# Patient Record
Sex: FEMALE | Race: WHITE | NOT HISPANIC OR LATINO
[De-identification: names, ages, dates, MRNs, and addresses within clinical notes are randomized per-mention and may not be internally consistent; named-entity substitution may affect disease eponyms.]

---

## 2021-05-26 ENCOUNTER — NON-APPOINTMENT (OUTPATIENT)
Age: 71
End: 2021-05-26

## 2021-05-26 ENCOUNTER — LABORATORY RESULT (OUTPATIENT)
Age: 71
End: 2021-05-26

## 2021-05-26 ENCOUNTER — APPOINTMENT (OUTPATIENT)
Dept: FAMILY MEDICINE | Facility: CLINIC | Age: 71
End: 2021-05-26
Payer: MEDICARE

## 2021-05-26 VITALS
TEMPERATURE: 97.2 F | HEIGHT: 61 IN | BODY MASS INDEX: 19.83 KG/M2 | SYSTOLIC BLOOD PRESSURE: 114 MMHG | DIASTOLIC BLOOD PRESSURE: 70 MMHG | HEART RATE: 106 BPM | OXYGEN SATURATION: 99 % | WEIGHT: 105 LBS

## 2021-05-26 DIAGNOSIS — R79.89 OTHER SPECIFIED ABNORMAL FINDINGS OF BLOOD CHEMISTRY: ICD-10-CM

## 2021-05-26 DIAGNOSIS — Z82.3 FAMILY HISTORY OF STROKE: ICD-10-CM

## 2021-05-26 PROCEDURE — G0444 DEPRESSION SCREEN ANNUAL: CPT | Mod: 59

## 2021-05-26 PROCEDURE — 99204 OFFICE O/P NEW MOD 45 MIN: CPT | Mod: 25

## 2021-05-26 PROCEDURE — 93000 ELECTROCARDIOGRAM COMPLETE: CPT | Mod: 59

## 2021-05-26 PROCEDURE — 36415 COLL VENOUS BLD VENIPUNCTURE: CPT

## 2021-05-26 NOTE — HEALTH RISK ASSESSMENT
[With Significant Other] : lives with significant other [# of Members in Household ___] :  household currently consist of [unfilled] member(s) [] :  [# Of Children ___] : has [unfilled] children [ColonoscopyComments] : never had  [de-identified] : daugther and granddaughter [FreeTextEntry3] : son and daughter

## 2021-05-26 NOTE — PHYSICAL EXAM
[Normal Sclera/Conjunctiva] : normal sclera/conjunctiva [Normal Oropharynx] : the oropharynx was normal [No Lymphadenopathy] : no lymphadenopathy [Supple] : supple [No Edema] : there was no peripheral edema [No Extremity Clubbing/Cyanosis] : no extremity clubbing/cyanosis [Normal] : affect was normal and insight and judgment were intact [de-identified] : esotropia [de-identified] : Tenderness to palpation upper thoracic paraspinal region [de-identified] : bilateral hands arthritic changes;  strength intact; patellar grind r knee > left.

## 2021-05-26 NOTE — HISTORY OF PRESENT ILLNESS
[FreeTextEntry1] : Ms. SHAW presents to establish care, joint pain and palpitations. \par  [de-identified] : Ms. SHAW presents to establish care, joint pain and palpitations. \par \par Follows with Dr. Reyes for Heme/Onc. \par Breast Cancer diagnosed 7 years ago. \par Has port. \par \par Had bloodwork done at beginning of May. \par \par -never had colonoscopy; never done colon cancer screening. \par \par Has been dealing with Arthritis in hands and wrists. \par Has been taking ibuprofen for knee pain. 2 tablets every 6 hours; started 2 weeks ago. Feels like she can't move in the AM. \par Takes Senna as needed for constipation. \par \par COVID shot March 28th completed. \par \par Has been experiencing palpitations-thinks it could be related to stress;  is sick. \par Medications and allergies reviewed.\par

## 2021-05-26 NOTE — REVIEW OF SYSTEMS
[Palpitations] : palpitations [Constipation] : constipation [Joint Pain] : joint pain [Negative] : Respiratory [Chest Pain] : no chest pain [Abdominal Pain] : no abdominal pain [Nausea] : no nausea [Vomiting] : no vomiting [Dysuria] : no dysuria [Headache] : no headache [Dizziness] : no dizziness [Suicidal] : not suicidal [de-identified] : Anxious;  diagnosed with small cell lung cnacer

## 2021-05-26 NOTE — PLAN
[FreeTextEntry1] : Will follow up labwork drawn in office today.\par \par Joint Pain-evaluate rheum markers.  May require rheumatology eval for bilateral joint pain. \par \par Palpitations-EKG non-acute; change from prior on file 8 years ago. Possible anxiety component. \par Recommending Cardio eval given change and cancer hx. \par \par Stress/Anxiety-discussed possibility of pursuing therapy; patient wants to hold off at this point; also discussed caregiver support groups.  PHQ-9 performed.  No suicidal thoughts or ideations. \par \par Will adjust meds based on labs. \par Discussed with MsKellen COLIN precautions and advised to go to seek immediate medical re-evaluation if condition worsened.  Ms. CHARLEE COLIN expressed understanding of the plan.\par \par

## 2021-05-27 DIAGNOSIS — M25.50 PAIN IN UNSPECIFIED JOINT: ICD-10-CM

## 2021-05-27 PROBLEM — R79.89 ABNORMAL CBC: Status: ACTIVE | Noted: 2021-05-27

## 2021-05-27 LAB
ALBUMIN SERPL ELPH-MCNC: 4.2 G/DL
ALP BLD-CCNC: 68 U/L
ALT SERPL-CCNC: 13 U/L
ANION GAP SERPL CALC-SCNC: 13 MMOL/L
AST SERPL-CCNC: 14 U/L
BASOPHILS # BLD AUTO: 0.08 K/UL
BASOPHILS NFR BLD AUTO: 0.7 %
BILIRUB SERPL-MCNC: 0.2 MG/DL
BUN SERPL-MCNC: 15 MG/DL
CALCIUM SERPL-MCNC: 9.7 MG/DL
CHLORIDE SERPL-SCNC: 102 MMOL/L
CHOLEST SERPL-MCNC: 242 MG/DL
CO2 SERPL-SCNC: 24 MMOL/L
CREAT SERPL-MCNC: 0.65 MG/DL
CRP SERPL-MCNC: 51 MG/L
EOSINOPHIL # BLD AUTO: 0.19 K/UL
EOSINOPHIL NFR BLD AUTO: 1.7 %
ERYTHROCYTE [SEDIMENTATION RATE] IN BLOOD BY WESTERGREN METHOD: 49 MM/HR
ESTIMATED AVERAGE GLUCOSE: 111 MG/DL
GLUCOSE SERPL-MCNC: 100 MG/DL
HBA1C MFR BLD HPLC: 5.5 %
HCT VFR BLD CALC: 40.2 %
HDLC SERPL-MCNC: 48 MG/DL
HGB BLD-MCNC: 12.6 G/DL
IMM GRANULOCYTES NFR BLD AUTO: 0.3 %
LDLC SERPL CALC-MCNC: 171 MG/DL
LYMPHOCYTES # BLD AUTO: 2.05 K/UL
LYMPHOCYTES NFR BLD AUTO: 18.1 %
MAN DIFF?: NORMAL
MCHC RBC-ENTMCNC: 26.4 PG
MCHC RBC-ENTMCNC: 31.3 GM/DL
MCV RBC AUTO: 84.1 FL
MONOCYTES # BLD AUTO: 1.03 K/UL
MONOCYTES NFR BLD AUTO: 9.1 %
NEUTROPHILS # BLD AUTO: 7.95 K/UL
NEUTROPHILS NFR BLD AUTO: 70.1 %
NONHDLC SERPL-MCNC: 194 MG/DL
PLATELET # BLD AUTO: 436 K/UL
POTASSIUM SERPL-SCNC: 4.1 MMOL/L
PROT SERPL-MCNC: 7 G/DL
RBC # BLD: 4.78 M/UL
RBC # FLD: 15.9 %
RHEUMATOID FACT SER QL: 196 IU/ML
SODIUM SERPL-SCNC: 140 MMOL/L
TRIGL SERPL-MCNC: 114 MG/DL
TSH SERPL-ACNC: 2.37 UIU/ML
WBC # FLD AUTO: 11.33 K/UL

## 2021-05-28 ENCOUNTER — TRANSCRIPTION ENCOUNTER (OUTPATIENT)
Age: 71
End: 2021-05-28

## 2021-06-14 ENCOUNTER — RX RENEWAL (OUTPATIENT)
Age: 71
End: 2021-06-14

## 2021-06-16 ENCOUNTER — APPOINTMENT (OUTPATIENT)
Dept: FAMILY MEDICINE | Facility: CLINIC | Age: 71
End: 2021-06-16
Payer: MEDICARE

## 2021-06-16 VITALS
OXYGEN SATURATION: 97 % | SYSTOLIC BLOOD PRESSURE: 120 MMHG | HEIGHT: 61 IN | BODY MASS INDEX: 19.83 KG/M2 | HEART RATE: 100 BPM | DIASTOLIC BLOOD PRESSURE: 76 MMHG | WEIGHT: 105 LBS | TEMPERATURE: 97.5 F

## 2021-06-16 DIAGNOSIS — J44.9 CHRONIC OBSTRUCTIVE PULMONARY DISEASE, UNSPECIFIED: ICD-10-CM

## 2021-06-16 DIAGNOSIS — F41.9 ANXIETY DISORDER, UNSPECIFIED: ICD-10-CM

## 2021-06-16 DIAGNOSIS — C50.919 MALIGNANT NEOPLASM OF UNSPECIFIED SITE OF UNSPECIFIED FEMALE BREAST: ICD-10-CM

## 2021-06-16 DIAGNOSIS — F32.9 ANXIETY DISORDER, UNSPECIFIED: ICD-10-CM

## 2021-06-16 DIAGNOSIS — D64.9 ANEMIA, UNSPECIFIED: ICD-10-CM

## 2021-06-16 DIAGNOSIS — M05.20 RHEUMATOID VASCULITIS WITH RHEUMATOID ARTHRITIS OF UNSPECIFIED SITE: ICD-10-CM

## 2021-06-16 PROCEDURE — 99214 OFFICE O/P EST MOD 30 MIN: CPT

## 2021-06-16 RX ORDER — DULOXETINE HYDROCHLORIDE 60 MG/1
60 CAPSULE, DELAYED RELEASE PELLETS ORAL
Qty: 30 | Refills: 2 | Status: ACTIVE | COMMUNITY
Start: 2021-06-16 | End: 1900-01-01

## 2021-06-16 NOTE — PHYSICAL EXAM
[Well Nourished] : well nourished [Well Developed] : well developed [Normal Sclera/Conjunctiva] : normal sclera/conjunctiva [Normal Oropharynx] : the oropharynx was normal [No Lymphadenopathy] : no lymphadenopathy [Supple] : supple [No Edema] : there was no peripheral edema [No Extremity Clubbing/Cyanosis] : no extremity clubbing/cyanosis [Normal] : affect was normal and insight and judgment were intact [de-identified] : esotropia [de-identified] : Tenderness to palpation upper thoracic paraspinal region [de-identified] : bilateral hands arthritic changes;  strength intact; patellar grind r knee > left.

## 2021-06-16 NOTE — HEALTH RISK ASSESSMENT
[With Significant Other] : lives with significant other [# of Members in Household ___] :  household currently consist of [unfilled] member(s) [] :  [# Of Children ___] : has [unfilled] children [ColonoscopyComments] : never had  [de-identified] : daugther and granddaughter [FreeTextEntry3] : son and daughter

## 2021-06-16 NOTE — REVIEW OF SYSTEMS
[Chest Pain] : no chest pain [Abdominal Pain] : no abdominal pain [Nausea] : no nausea [Constipation] : constipation [Vomiting] : no vomiting [Dysuria] : no dysuria [Joint Pain] : joint pain [Headache] : no headache [Dizziness] : no dizziness [Suicidal] : not suicidal [Negative] : Respiratory [FreeTextEntry9] : chronic joint pain [de-identified] : Anxious;  diagnosed with small cell lung cnacer

## 2021-06-16 NOTE — HISTORY OF PRESENT ILLNESS
[de-identified] : 71 year old female here with complaints of severe joint pain. Patients active medications, allergies and issues were all reviewed with the patient at time of visit.\par \par \par Follows with Dr. Reyes for Heme/Onc. \par Breast Cancer diagnosed 7 years ago. \par Has port. \par \par Had bloodwork done at beginning of May. \par \par HM-never had colonoscopy; never done colon cancer screening. \par \par COVID shot March 28th completed. \par \par \par

## 2021-06-16 NOTE — ASSESSMENT
[FreeTextEntry1] : breat cancer\par follows with oncology\par \par anxiety\par Discussed diagnosis of anxiety and depression with the patient and potential outcomes/side affects of treatment versus non treatment. Medications were assessed and described at length. Side affects and black box warning were discussed.  Patient was advised to continue will all medications prescribed and the need for compliance was discussed and emphasized. Patient was advised to not stop medications without discussing with a health care provider first. Patient was advised to continue psychotherapy or seek therapy if not currently attending. Patient was educated on addictive potential of controlled substances and was counseled to use only as needed and sparingly. Patient verbalized understanding of all the above and all questions were answered.\par severe, will try cymbalta for pain and anxiety\par reports that her anxiety is through the roof, everything that goes wrong becomes a big deal\par will try cymbalta\par \par RA\par somewhat improved with mobic\par will add cymbalta\par will try and help patient see rheum earlier, reviewed bw\par \par anemia\par start iron, reviewed bw\par \par contstipation\par on senna daily

## 2021-07-16 ENCOUNTER — APPOINTMENT (OUTPATIENT)
Dept: CARDIOLOGY | Facility: CLINIC | Age: 71
End: 2021-07-16
Payer: MEDICARE

## 2021-07-16 VITALS
HEIGHT: 61 IN | OXYGEN SATURATION: 97 % | BODY MASS INDEX: 20.2 KG/M2 | TEMPERATURE: 97.9 F | DIASTOLIC BLOOD PRESSURE: 73 MMHG | HEART RATE: 96 BPM | WEIGHT: 107 LBS | SYSTOLIC BLOOD PRESSURE: 116 MMHG

## 2021-07-16 DIAGNOSIS — R00.2 PALPITATIONS: ICD-10-CM

## 2021-07-16 DIAGNOSIS — E78.5 HYPERLIPIDEMIA, UNSPECIFIED: ICD-10-CM

## 2021-07-16 DIAGNOSIS — R94.31 ABNORMAL ELECTROCARDIOGRAM [ECG] [EKG]: ICD-10-CM

## 2021-07-16 PROCEDURE — 99203 OFFICE O/P NEW LOW 30 MIN: CPT

## 2021-07-16 NOTE — DISCUSSION/SUMMARY
[FreeTextEntry1] : Pt with abnormal ECG, baseline sinus tach, septal infarct. Had palpitations but largely resolved. Possible hx of anthracycline chemotherapy. \par \par Will set up for echo\par Would like to pursue ischemic testing at some point. Pt with numerous risk factors including long time smoker, RA. Pt currently without symptoms, has difficulty traveling, and is caring for her sick  at home so will hold off on testing at present. Will consider after echo \par Rheum eval for RA \par \par Lipids very elevated, trend lipids now on statin \par \par RV 6 months

## 2021-07-16 NOTE — HISTORY OF PRESENT ILLNESS
[FreeTextEntry1] : 71F with HLD, likely RA, hx of breast Ca s/p lumpectomy, presents for evaluation of palpitations, sinus tach, abnormal ECG\par \par A few months ago, pt had noted frequent palpitations in the setting of being under a lot of stress. Those have since subsided, she still feels a "flutter" from time to time. Denies CP, lightheadedness, syncope. She does note some dyspnea with climbing stairs\par \par Hx of breast Ca in 2013, c/p chemo and RT. \par \par Current smoker but working on quitting. Denies family hx of CAD, mother living at 93. Retired, used to own a cleaning business.\par \par ECG: SR with septal infarct\par \par Atorvastatin 10mg

## 2021-08-20 ENCOUNTER — APPOINTMENT (OUTPATIENT)
Dept: INTERNAL MEDICINE | Facility: CLINIC | Age: 71
End: 2021-08-20
Payer: MEDICARE

## 2021-08-20 PROCEDURE — 93306 TTE W/DOPPLER COMPLETE: CPT

## 2021-09-03 ENCOUNTER — APPOINTMENT (OUTPATIENT)
Dept: RHEUMATOLOGY | Facility: CLINIC | Age: 71
End: 2021-09-03

## 2024-06-11 ENCOUNTER — NON-APPOINTMENT (OUTPATIENT)
Age: 74
End: 2024-06-11

## 2024-06-11 ENCOUNTER — APPOINTMENT (OUTPATIENT)
Dept: FAMILY MEDICINE | Facility: CLINIC | Age: 74
End: 2024-06-11
Payer: MEDICARE

## 2024-06-11 VITALS
HEART RATE: 90 BPM | HEIGHT: 61 IN | DIASTOLIC BLOOD PRESSURE: 73 MMHG | OXYGEN SATURATION: 98 % | RESPIRATION RATE: 15 BRPM | SYSTOLIC BLOOD PRESSURE: 135 MMHG

## 2024-06-11 VITALS — TEMPERATURE: 98 F | BODY MASS INDEX: 22.11 KG/M2 | WEIGHT: 117 LBS

## 2024-06-11 DIAGNOSIS — M81.0 AGE-RELATED OSTEOPOROSIS W/OUT CURRENT PATHOLOGICAL FRACTURE: ICD-10-CM

## 2024-06-11 DIAGNOSIS — Z01.818 ENCOUNTER FOR OTHER PREPROCEDURAL EXAMINATION: ICD-10-CM

## 2024-06-11 PROCEDURE — 99213 OFFICE O/P EST LOW 20 MIN: CPT | Mod: 25

## 2024-06-11 PROCEDURE — 93000 ELECTROCARDIOGRAM COMPLETE: CPT

## 2024-06-11 RX ORDER — MULTIVIT-MIN/IRON/FOLIC ACID/K 18-600-40
50 MCG CAPSULE ORAL
Refills: 0 | Status: ACTIVE | COMMUNITY
Start: 2024-06-11

## 2024-06-11 RX ORDER — SULFASALAZINE 500 MG/1
500 TABLET ORAL TWICE DAILY
Refills: 0 | Status: ACTIVE | COMMUNITY
Start: 2024-06-11

## 2024-06-11 RX ORDER — DICLOFENAC SODIUM 75 MG/1
75 TABLET, DELAYED RELEASE ORAL
Qty: 180 | Refills: 1 | Status: ACTIVE | COMMUNITY
Start: 2024-06-11

## 2024-06-11 RX ORDER — ANASTROZOLE TABLETS 1 MG/1
1 TABLET ORAL
Refills: 0 | Status: DISCONTINUED | COMMUNITY
End: 2024-06-11

## 2024-06-11 RX ORDER — MELOXICAM 15 MG/1
15 TABLET ORAL
Qty: 90 | Refills: 1 | Status: DISCONTINUED | COMMUNITY
Start: 2021-05-27 | End: 2024-06-11

## 2024-06-11 RX ORDER — ATORVASTATIN CALCIUM 10 MG/1
10 TABLET, FILM COATED ORAL DAILY
Qty: 90 | Refills: 0 | Status: DISCONTINUED | COMMUNITY
Start: 2021-05-27 | End: 2024-06-11

## 2024-06-11 RX ORDER — IBANDRONATE SODIUM 150 MG/1
150 TABLET ORAL
Refills: 0 | Status: DISCONTINUED | COMMUNITY
End: 2024-06-11

## 2024-06-11 NOTE — HISTORY OF PRESENT ILLNESS
[No Pertinent Cardiac History] : no history of aortic stenosis, atrial fibrillation, coronary artery disease, recent myocardial infarction, or implantable device/pacemaker [Smoker] : smoker [No Adverse Anesthesia Reaction] : no adverse anesthesia reaction in self or family member [Chronic Anticoagulation] : no chronic anticoagulation [Chronic Kidney Disease] : no chronic kidney disease [Diabetes] : no diabetes [(Patient denies any chest pain, claudication, dyspnea on exertion, orthopnea, palpitations or syncope)] : Patient denies any chest pain, claudication, dyspnea on exertion, orthopnea, palpitations or syncope [Good (7-10 METs)] : Good (7-10 METs) [FreeTextEntry1] : cataract surgery [FreeTextEntry2] : 7/1/2024 [FreeTextEntry3] : Dr. Carlos Kuo  [FreeTextEntry4] : 75 yo F PMHx Breast Cancer (in remission), Rheumatoid Arthritis, Osteoporosis presenting for MCA. feels well.

## 2024-06-11 NOTE — PLAN
[FreeTextEntry1] : EKG no acute changes  Patient is medically optimized to proceed with cataract surgery

## 2024-06-11 NOTE — PHYSICAL EXAM
[No Acute Distress] : no acute distress [Well Nourished] : well nourished [Well Developed] : well developed [Well-Appearing] : well-appearing [No JVD] : no jugular venous distention [No Lymphadenopathy] : no lymphadenopathy [Supple] : supple [Thyroid Normal, No Nodules] : the thyroid was normal and there were no nodules present [No Respiratory Distress] : no respiratory distress  [No Accessory Muscle Use] : no accessory muscle use [Clear to Auscultation] : lungs were clear to auscultation bilaterally [Normal Rate] : normal rate  [Regular Rhythm] : with a regular rhythm [Normal S1, S2] : normal S1 and S2 [No Murmur] : no murmur heard [Soft] : abdomen soft [Non Tender] : non-tender [Non-distended] : non-distended [No Masses] : no abdominal mass palpated [No HSM] : no HSM [Normal Bowel Sounds] : normal bowel sounds [Normal Affect] : the affect was normal [Normal Insight/Judgement] : insight and judgment were intact

## 2024-06-11 NOTE — ASSESSMENT
[Patient Optimized for Surgery] : Patient optimized for surgery [Modify medications prior to procedure] : Modify medications prior to procedure [As per surgery] : as per surgery [FreeTextEntry7] : STOP NSAIDS 7 D prior to procedure

## 2024-10-23 ENCOUNTER — APPOINTMENT (OUTPATIENT)
Dept: FAMILY MEDICINE | Facility: CLINIC | Age: 74
End: 2024-10-23
Payer: MEDICARE

## 2024-10-23 VITALS
DIASTOLIC BLOOD PRESSURE: 67 MMHG | TEMPERATURE: 98.7 F | WEIGHT: 128 LBS | OXYGEN SATURATION: 96 % | HEART RATE: 86 BPM | SYSTOLIC BLOOD PRESSURE: 147 MMHG | HEIGHT: 61 IN | RESPIRATION RATE: 16 BRPM | BODY MASS INDEX: 24.17 KG/M2

## 2024-10-23 DIAGNOSIS — Z01.818 ENCOUNTER FOR OTHER PREPROCEDURAL EXAMINATION: ICD-10-CM

## 2024-10-23 PROCEDURE — 99213 OFFICE O/P EST LOW 20 MIN: CPT
